# Patient Record
Sex: MALE | Race: WHITE | NOT HISPANIC OR LATINO | Employment: FULL TIME | URBAN - METROPOLITAN AREA
[De-identification: names, ages, dates, MRNs, and addresses within clinical notes are randomized per-mention and may not be internally consistent; named-entity substitution may affect disease eponyms.]

---

## 2019-05-28 ENCOUNTER — OFFICE VISIT - HEALTHEAST (OUTPATIENT)
Dept: FAMILY MEDICINE | Facility: CLINIC | Age: 49
End: 2019-05-28

## 2019-05-28 DIAGNOSIS — Z23 NEED FOR DIPHTHERIA-TETANUS-PERTUSSIS (TDAP) VACCINE: ICD-10-CM

## 2019-05-28 DIAGNOSIS — Z72.0 TOBACCO ABUSE: ICD-10-CM

## 2019-05-28 DIAGNOSIS — Z00.00 ANNUAL PHYSICAL EXAM: ICD-10-CM

## 2019-05-28 DIAGNOSIS — H61.23 HEARING LOSS DUE TO CERUMEN IMPACTION, BILATERAL: ICD-10-CM

## 2019-05-28 DIAGNOSIS — R76.8 POSITIVE HEPATITIS C ANTIBODY TEST: ICD-10-CM

## 2019-05-28 DIAGNOSIS — Z13.220 LIPID SCREENING: ICD-10-CM

## 2019-05-28 DIAGNOSIS — Z13.1 SCREENING FOR DIABETES MELLITUS: ICD-10-CM

## 2019-05-28 ASSESSMENT — MIFFLIN-ST. JEOR: SCORE: 1553.56

## 2019-05-29 LAB
ALBUMIN SERPL-MCNC: 4.3 G/DL (ref 3.5–5)
ALP SERPL-CCNC: 64 U/L (ref 45–120)
ALT SERPL W P-5'-P-CCNC: 24 U/L (ref 0–45)
ANION GAP SERPL CALCULATED.3IONS-SCNC: 11 MMOL/L (ref 5–18)
AST SERPL W P-5'-P-CCNC: 20 U/L (ref 0–40)
BILIRUB SERPL-MCNC: 0.5 MG/DL (ref 0–1)
BUN SERPL-MCNC: 12 MG/DL (ref 8–22)
CALCIUM SERPL-MCNC: 9.8 MG/DL (ref 8.5–10.5)
CHLORIDE BLD-SCNC: 102 MMOL/L (ref 98–107)
CHOLEST SERPL-MCNC: 215 MG/DL
CO2 SERPL-SCNC: 26 MMOL/L (ref 22–31)
CREAT SERPL-MCNC: 0.89 MG/DL (ref 0.7–1.3)
FASTING STATUS PATIENT QL REPORTED: NO
GFR SERPL CREATININE-BSD FRML MDRD: >60 ML/MIN/1.73M2
GLUCOSE BLD-MCNC: 93 MG/DL (ref 70–125)
HDLC SERPL-MCNC: 47 MG/DL
LDLC SERPL CALC-MCNC: 123 MG/DL
POTASSIUM BLD-SCNC: 3.7 MMOL/L (ref 3.5–5)
PROT SERPL-MCNC: 7.2 G/DL (ref 6–8)
SODIUM SERPL-SCNC: 139 MMOL/L (ref 136–145)
TRIGL SERPL-MCNC: 226 MG/DL

## 2019-05-30 LAB — HCV AB SERPL QL IA: POSITIVE

## 2019-05-31 ENCOUNTER — AMBULATORY - HEALTHEAST (OUTPATIENT)
Dept: FAMILY MEDICINE | Facility: CLINIC | Age: 49
End: 2019-05-31

## 2019-05-31 DIAGNOSIS — Z59.71 INSURANCE COVERAGE PROBLEMS: ICD-10-CM

## 2019-05-31 DIAGNOSIS — R76.8 POSITIVE HEPATITIS C ANTIBODY TEST: ICD-10-CM

## 2019-06-04 ENCOUNTER — COMMUNICATION - HEALTHEAST (OUTPATIENT)
Dept: NURSING | Facility: CLINIC | Age: 49
End: 2019-06-04

## 2019-06-19 ENCOUNTER — COMMUNICATION - HEALTHEAST (OUTPATIENT)
Dept: FAMILY MEDICINE | Facility: CLINIC | Age: 49
End: 2019-06-19

## 2019-06-25 ENCOUNTER — COMMUNICATION - HEALTHEAST (OUTPATIENT)
Dept: NURSING | Facility: CLINIC | Age: 49
End: 2019-06-25

## 2019-08-05 ENCOUNTER — COMMUNICATION - HEALTHEAST (OUTPATIENT)
Dept: FAMILY MEDICINE | Facility: CLINIC | Age: 49
End: 2019-08-05

## 2020-02-05 ENCOUNTER — COMMUNICATION - HEALTHEAST (OUTPATIENT)
Dept: FAMILY MEDICINE | Facility: CLINIC | Age: 50
End: 2020-02-05

## 2020-07-12 ENCOUNTER — COMMUNICATION - HEALTHEAST (OUTPATIENT)
Dept: SCHEDULING | Facility: CLINIC | Age: 50
End: 2020-07-12

## 2021-04-30 ENCOUNTER — AMBULATORY - HEALTHEAST (OUTPATIENT)
Dept: NURSING | Facility: CLINIC | Age: 51
End: 2021-04-30

## 2021-05-21 ENCOUNTER — AMBULATORY - HEALTHEAST (OUTPATIENT)
Dept: NURSING | Facility: CLINIC | Age: 51
End: 2021-05-21

## 2021-05-28 ENCOUNTER — RECORDS - HEALTHEAST (OUTPATIENT)
Dept: ADMINISTRATIVE | Facility: CLINIC | Age: 51
End: 2021-05-28

## 2021-05-29 NOTE — PROGRESS NOTES
Attempt 2: Care Guide called patient.  If this patient is returning my call, please transfer to Tatiana at ext [phone].

## 2021-05-29 NOTE — PROGRESS NOTES
Assessment:      Healthy male exam.      Plan:      1. Annual physical exam     2. Lipid screening  Lipid Cascade   3. Tobacco abuse     4. Screening for diabetes mellitus  Glucose   5. Need for diphtheria-tetanus-pertussis (Tdap) vaccine  Tdap vaccine,  6yo or older,  IM   6. Hearing loss due to cerumen impaction, bilateral       The right ear was lavaged and the patient experienced dizziness.  Enough lavage was performed where it broke up the cerumen and TM was visible post procedure.  We elected to stop and let him try to clear out the left ear with an over-the-counter lavage kit.  Recommended no Q-tips.  Encouraged no tobacco use.  Encouraged him also to update his tetanus but he declines.  Check screening labs along with liver function and recheck of hepatitis C antibodies.    Subjective:      Danilo Agrawal is a 49 y.o. male who presents for an annual exam. The patient reports that there is not domestic violence in his life.     Overall doing well.  No significant concerns today.  He does continue to use chewing tobacco and is not ready to quit.  Works as a .  Due for tetanus.  Has a fiancé with whom he has been together with for years.  Previous labs done years ago show that he had an equivocal hepatitis C and was told to come back in a month to recheck it although that never happened.  No upper right quadrant pain.  No complaints of jaundice, nausea, unintentional weight loss, fatigue, malaise.    Healthy Habits:   Regular Exercise: Yes  Sunscreen Use: No  Healthy Diet: Yes  Dental Visits Regularly: No-  Seat Belt: Yes  Sexually active: Yes  Monthly Self Testicular Exams:  No  Hemoccults: No  Flex Sig: No  Colonoscopy: No  Lipid Profile: No  Glucose Screen: Yes  Prevention of Osteoporosis: No  Last Dexa: No  Guns at Home:  No      There is no immunization history on file for this patient.  Immunization status: up to date and documented, due today.    No exam data present    No current outpatient  medications on file.     No current facility-administered medications for this visit.      Past Medical History:   Diagnosis Date     Tobacco abuse     chews     Past Surgical History:   Procedure Laterality Date     ARM WOUND REPAIR / CLOSURE  2007    work comp     chest wound  2007    work comp     Patient has no known allergies.  Family History   Problem Relation Age of Onset     Diabetes Mother      Hypertension Mother      Hyperlipidemia Mother      Hypertension Father      Hyperlipidemia Father      Cardiomyopathy Father      Breast cancer Sister      COPD Maternal Grandmother      Heart disease Maternal Grandfather      Diabetes Maternal Grandfather      COPD Paternal Grandmother      Hypertension Paternal Grandmother      Stroke Paternal Grandmother      Heart disease Paternal Grandmother      Social History     Socioeconomic History     Marital status: Single     Spouse name: Not on file     Number of children: Not on file     Years of education: Not on file     Highest education level: Not on file   Occupational History     Not on file   Social Needs     Financial resource strain: Not on file     Food insecurity:     Worry: Not on file     Inability: Not on file     Transportation needs:     Medical: Not on file     Non-medical: Not on file   Tobacco Use     Smoking status: Former Smoker     Smokeless tobacco: Current User   Substance and Sexual Activity     Alcohol use: Not on file     Drug use: Not on file     Sexual activity: Not on file   Lifestyle     Physical activity:     Days per week: Not on file     Minutes per session: Not on file     Stress: Not on file   Relationships     Social connections:     Talks on phone: Not on file     Gets together: Not on file     Attends [employer] service: Not on file     Active member of club or organization: Not on file     Attends meetings of clubs or organizations: Not on file     Relationship status: Not on file     Intimate partner violence:     Fear of  "current or ex partner: Not on file     Emotionally abused: Not on file     Physically abused: Not on file     Forced sexual activity: Not on file   Other Topics Concern     Not on file   Social History Narrative     Not on file       Review of Systems  General:  Denies problem  Eyes: Denies problem  Ears/Nose/Throat: Denies problem  Cardiovascular: Denies problem  Respiratory:  Denies problem  Gastrointestinal:  Denies problem  Genitourinary: Denies problem  Musculoskeletal:  Denies problem  Skin: Denies problem  Neurologic: Denies problem  Psychiatric: Denies problem  Endocrine: Denies problem  Heme/Lymphatic: Denies problem   Allergic/Immunologic: Denies problem        Objective:     Vitals:    05/28/19 1633   BP: 114/78   Pulse: 63   Temp: 98.6  F (37  C)   TempSrc: Oral   SpO2: 97%   Weight: 164 lb (74.4 kg)   Height: 5' 6.75\" (1.695 m)     Body mass index is 25.88 kg/m .    Physical  General Appearance: Alert, cooperative, no distress, appears stated age  Head: Normocephalic, without obvious abnormality, atraumatic  Eyes: PERRL, conjunctiva/corneas clear, EOM's intact  Ears: Large amounts of cerumen impaction bilaterally.    Nose: Nares normal, septum midline,mucosa normal, no drainage  Throat: Lips, mucosa, and tongue normal; teeth and gums normal  Neck: Supple, symmetrical, trachea midline, no adenopathy;  thyroid: not enlarged, symmetric, no tenderness/mass/nodules; no carotid bruit or JVD  Back: Symmetric, no curvature, ROM normal, no CVA tenderness  Lungs: Clear to auscultation bilaterally, respirations unlabored  Heart: Regular rate and rhythm, S1 and S2 normal, no murmur, rub, or gallop,  Abdomen: Soft, non-tender, bowel sounds active all four quadrants,  no masses, no organomegaly  Genitourinary: Penis normal. Right testis is descended. Left testis is descended.   Musculoskeletal: Normal range of motion. No joint swelling or deformity.   Extremities: Extremities normal, atraumatic, no cyanosis or " edema  Skin: Skin color, texture, turgor normal, no rashes or lesions  Lymph nodes: Cervical, supraclavicular, and axillary nodes normal  Neurologic: He is alert. He has normal reflexes.   Psychiatric: He has a normal mood and affect.      Jarrod Mujica, CNP

## 2021-05-29 NOTE — PROGRESS NOTES
Attempt 1: Care Guide called patient.  If this patient is returning my call, please transfer to Tatiana at ext [phone].

## 2021-05-29 NOTE — PATIENT INSTRUCTIONS - HE
You need to update your tetanus booster, especially given your line of work. I've placed the order and you can get it done here during our shot clinic anytime    Stopping all tobacco will be the most important thing you can do for your long term health.    Starting next year you will start screening for prostate cancer and colon cancer.    Thank you for coming in today!    If you receive a survey from [employer] about your experience today, it would be very helpful if you could fill it out to let us know what went well and what we can improve!    General Information:    Today you had your appointment with Jarrod Mujica NP    My hours are:    Monday : Out of clinic  Tuesday : 8:00AM - 5:00 PM  Wednesday: 8:00AM - 5:00 PM  Thursday: 8:00AM - 5:00 PM  Friday: 8:00AM - 5:00 PM    I am not in the office Mondays. Therefore non-urgent calls and medical messages received on Monday will be addressed when I am back in the office on Tuesday. Urgent matters will be reviewed and addressed by one of my partners in the office as needed.    If lab work was done today as part of your evaluation you will generally be contacted via [employer], mail, or phone with the results within 1-5 days. If there is an alarming result we will contact you by phone. Lab results come back at varying times, I generally wait until all lab results are available before making comments on the results.     If you need refills please contact your pharmacy. They will send a refill request to me to review. Please allow 3-5 business days for us to process all refill requests.     My Clinical Assistant is Tiffanie. Please call us at [phone] or send a medical message with any questions or concerns.

## 2021-05-30 ENCOUNTER — RECORDS - HEALTHEAST (OUTPATIENT)
Dept: ADMINISTRATIVE | Facility: CLINIC | Age: 51
End: 2021-05-30

## 2021-05-30 NOTE — PROGRESS NOTES
Care Guide has called this patient 3 times over the past two weeks and has been unsuccessful in reaching him. This patient has also not returned any of my messages. Patient has been unsuccessful to reach by phone or [facility] and no further outreach will be done and at this time will be removed from the work queue. I have discussed with the patient's PCP that I have been unable to reach the patient.

## 2021-06-03 VITALS — HEIGHT: 67 IN | WEIGHT: 164 LBS | BODY MASS INDEX: 25.74 KG/M2

## 2021-06-09 NOTE — TELEPHONE ENCOUNTER
Patient calling - says his boss sent him a message and says everyone with the company is supposed to get tested for COVID19 tomorrow.  His boss's family member has COVID19.  Patient has cough and sore throat that started this morning.  Has not taken his temperature.    No difficulty breathing.  No chest pain.    Triaged to disposition of Call PCP Within 24 Hours.  Per current nurse triage protocol - advised patient to go to [email or web address] now to set up virtual visit.  Care advice and isolation recommendations given per protocol.    Advised to call back if difficulty breathing occurs, chest pain occurs, fever over 103 occurs or symptoms worsen.    Carlita Bryan, WINSOME  Triage Nurse Advisor    COVID 19 Nurse Triage Plan/Patient Instructions    Please be aware that novel coronavirus (COVID-19) may be circulating in the community. If you develop symptoms such as fever, cough, or SOB or if you have concerns about the presence of another infection including coronavirus (COVID-19), please contact your health care provider or visit [email or web address].     Disposition/Instructions    Virtual Visit with provider recommended. Reference Visit Selection Guide.    Thank you for taking steps to prevent the spread of this virus.  o Limit your contact with others.  o Wear a simple mask to cover your cough.  o Wash your hands well and often.    Resources    [facility]: About COVID-19: [email or web address]    CDC: What to Do If You're Sick: www.cdc.gov/coronavirus/2019-ncov/about/steps-when-sick.html    CDC: Ending Home Isolation: www.cdc.gov/coronavirus/2019-ncov/hcp/disposition-in-home-patients.html     CDC: Caring for Someone: www.cdc.gov/coronavirus/2019-ncov/if-you-are-sick/care-for-someone.html     [facility]: Interim Guidance for Hospital Discharge to Home: www.health.[facility]/diseases/coronavirus/hcp/hospdischarge.pdf    [facility] clinical trials (COVID-19 research studies):  [email or web address].[facility]-clinical-trials     Below are the COVID-19 hotlines at the [place] [facility] ([facility]). Interpreters are available.   o For health questions: Call 018-127-0211 or 1-629.223.7676 (7 a.m. to 7 p.m.)  o For questions about schools and childcare: Call 658-508-5645 or 1-123.741.3856 (7 a.m. to 7 p.m.)         Reason for Disposition    [1] COVID-19 infection suspected by caller or triager AND [2] mild symptoms (cough, fever, or others) AND [3] no complications or SOB    Additional Information    Negative: SEVERE difficulty breathing (e.g., struggling for each breath, speaks in single words)    Negative: Difficult to awaken or acting confused (e.g., disoriented, slurred speech)    Negative: Bluish (or gray) lips or face now    Negative: Shock suspected (e.g., cold/pale/clammy skin, too weak to stand, low BP, rapid pulse)    Negative: Sounds like a life-threatening emergency to the triager    Negative: [1] COVID-19 exposure AND [2] no symptoms    Negative: COVID-19 and Breastfeeding, questions about    Negative: [1] Adult with possible COVID-19 symptoms AND [2] triager concerned about severity of symptoms or other causes    Negative: SEVERE or constant chest pain or pressure (Exception: mild central chest pain, present only when coughing)    Negative: MODERATE difficulty breathing (e.g., speaks in phrases, SOB even at rest, pulse 100-120)    Negative: Patient sounds very sick or weak to the triager    Negative: MILD difficulty breathing (e.g., minimal/no SOB at rest, SOB with walking, pulse <100)    Negative: Chest pain or pressure    Negative: Fever > 103 F (39.4 C)    Negative: [1] Fever > 101 F (38.3 C) AND [2] age > 60    Negative: [1] Fever > 100.0 F (37.8 C) AND [2] bedridden (e.g., nursing home patient, CVA, chronic illness, recovering from surgery)    Negative: HIGH RISK patient (e.g., age > 64 years, diabetes, heart or lung disease, weak immune system)    Negative: Fever present  > 3 days (72 hours)    Negative: [1] Fever returns after gone for over 24 hours AND [2] symptoms worse or not improved    Negative: [1] Continuous (nonstop) coughing interferes with work or school AND [2] no improvement using cough treatment per protocol    Protocols used: CORONAVIRUS (COVID-19) DIAGNOSED OR [id number] 5.16.20

## 2021-06-20 NOTE — LETTER
Letter by Jarrod Mujica CNP at      Author: Jarrod Mujica CNP Service: -- Author Type: --    Filed:  Encounter Date: 2/5/2020 Status: (Other)           Danilo Agrawal  [address] [facility] [address]      February 5, 2020      Dear Danilo Agrawal ,     It is our goal to provide quality care to you and help you maintain optimum health. Our records show that you are currently due for a colonoscopy. Colon and rectum cancer is the second leading cause of cancer death among American men and women. With early detection, colorectal cancer is highly preventable, treatable and often curable. Risk for colorectal cancer increases with age and your best defense are regular colonoscopy screenings.     Recommendations for men and women with an average risk of colorectal cancer  -Ages 50 and older colonoscopy screenings every 10 years    -If you are unable to do this particular screening there are other screening tests that may be completed. Contact your primary doctor to discuss these alternative options.     Please Call [phone] or send a [employer] message to let us know you would like to set up your colonoscopy. Your primary doctor can place an order. We partner with [facility] to complete these screenings. They will call to help you set up an appointment at your convenience.      Please call us [phone] with any other questions or concerns regarding your health.  Our clinic is open Monday - Friday between 8 AM - 5 PM.          Sincerely,        [facility] Care Team

## 2021-07-03 NOTE — ADDENDUM NOTE
Addendum Note by Shira Watt CMA at 5/28/2019  4:20 PM     Author: Shira Watt CMA Service: -- Author Type: Certified Medical Assistant    Filed: 5/29/2019  4:22 PM Encounter Date: 5/28/2019 Status: Signed    : Shira Watt CMA (Certified Medical Assistant)    Addended by: SHIRA WATT on: 5/29/2019 04:22 PM        Modules accepted: Orders

## 2022-04-20 ENCOUNTER — OFFICE VISIT (OUTPATIENT)
Dept: FAMILY MEDICINE | Facility: CLINIC | Age: 52
End: 2022-04-20
Payer: COMMERCIAL

## 2022-04-20 ENCOUNTER — HOSPITAL ENCOUNTER (OUTPATIENT)
Dept: ULTRASOUND IMAGING | Facility: CLINIC | Age: 52
Discharge: HOME | End: 2022-04-20
Attending: PHYSICIAN ASSISTANT | Admitting: PHYSICIAN ASSISTANT
Payer: COMMERCIAL

## 2022-04-20 VITALS
TEMPERATURE: 98 F | BODY MASS INDEX: 24.62 KG/M2 | OXYGEN SATURATION: 98 % | DIASTOLIC BLOOD PRESSURE: 80 MMHG | WEIGHT: 156 LBS | HEART RATE: 58 BPM | SYSTOLIC BLOOD PRESSURE: 125 MMHG | RESPIRATION RATE: 18 BRPM

## 2022-04-20 DIAGNOSIS — S86.812A STRAIN OF CALF MUSCLE, LEFT, INITIAL ENCOUNTER: ICD-10-CM

## 2022-04-20 DIAGNOSIS — M79.662 PAIN OF LEFT CALF: Primary | ICD-10-CM

## 2022-04-20 DIAGNOSIS — M79.662 PAIN OF LEFT CALF: ICD-10-CM

## 2022-04-20 PROCEDURE — 99214 OFFICE O/P EST MOD 30 MIN: CPT | Performed by: PHYSICIAN ASSISTANT

## 2022-04-20 PROCEDURE — 93971 EXTREMITY STUDY: CPT | Mod: LT

## 2022-04-20 ASSESSMENT — ENCOUNTER SYMPTOMS
SHORTNESS OF BREATH: 0
COLOR CHANGE: 0
WOUND: 0

## 2022-04-20 NOTE — PROGRESS NOTES
Assessment & Plan:        ICD-10-CM    1. Pain of left calf  M79.662 US Lower Extremity Venous Duplex Left     CANCELED: US Lower Extremity Venous Duplex Left   2. Strain of calf muscle, left, initial encounter  S86.812A          Plan/Clinical Decision Making:    Suspect calf strain.    Obtain US to r/o DVT.   As long as negative US will treat as musculoskeletal injury.   Take Naproxen twice a day with food, frequent stretches. Ice and rest.     Follow-up if worsening or not improving.     At the end of the encounter, I discussed results, diagnosis, medications. Discussed red flags for immediate return to clinic/ER, as well as indications for follow up if no improvement. Patient understood and agreed to plan. Patient was stable for discharge.        Sada Muniz PA-C on 4/20/2022 at 11:37 AM          Subjective:     HPI:    Danilo is a 52 year old male who presents to clinic today for the following health issues:  Chief Complaint   Patient presents with     Leg Problem      Left leg pain off and on since last Friday morning.     HPI    Patient complains of off and on calf pain. Started on Friday. Better over weekend.   On Monday and Tuesday had increased calf pain. Having swelling.   Works as .  No hx of injury or trauma.   Feeling area of firmness in calf.   This morning had some pain when he woke up.  After taking a shower felt better.   Has tried Aleve. Has tried OTC muscle cream.   Severity of pain fluctuating.   Stairs seem to bother pain.     History obtained from the patient.    Review of Systems   Respiratory: Negative for shortness of breath.    Cardiovascular: Negative for chest pain.   Skin: Negative for color change, rash and wound.     Nonsmoker.   Hx of chewing.     Currently on Amoxicillin for sinus infection.     Patient Active Problem List   Diagnosis     Nicotine Dependence     Limb Pain        No past medical history on file.    Social History     Tobacco Use     Smoking status: Former  Smoker     Smokeless tobacco: Current User   Substance Use Topics     Alcohol use: Yes             Objective:     Vitals:    04/20/22 1133   BP: 125/80   Pulse: 58   Resp: 18   Temp: 98  F (36.7  C)   TempSrc: Oral   SpO2: 98%   Weight: 70.8 kg (156 lb)         Physical Exam   EXAM:   Pleasant, alert, appropriate appearance. NAD.  Head Exam: Normocephalic, atraumatic.  Neck/Thyroid Exam:  No LAD.  No nodules or enlargement.  Chest/Respiratory Exam: CTAB.  Cardiovascular Exam: RRR. No murmur or rubs.  Ext/musculoskeletal: FROM of left calf.  Nl posterior tibialis pulse.    Bilateral calves measure 37cm, no redness, pain on palpation of calf.   Neuro: CN II-XII intact grossly intact.  Skin: no rash or lesion.      Results:  Results for orders placed or performed during the hospital encounter of 04/20/22   US Lower Extremity Venous Duplex Left     Status: None    Narrative    EXAM: US LOWER EXTREMITY VENOUS DUPLEX LEFT  LOCATION: [facility]  DATE/TIME: 4/20/2022 7:05 PM    INDICATION: calf pain x 1 week  COMPARISON: None.  TECHNIQUE: Venous Duplex ultrasound of the left lower extremity with and without compression, augmentation and duplex. Color flow and spectral Doppler with waveform analysis performed.    FINDINGS: Exam includes the common femoral, femoral, popliteal, and contralateral common femoral veins as well as segmentally visualized deep calf veins and greater saphenous vein.     LEFT: No deep vein thrombosis. No superficial thrombophlebitis. No popliteal cyst.      Impression    IMPRESSION:  1.  No deep venous thrombosis in the left lower extremity.

## 2023-01-26 ENCOUNTER — OFFICE VISIT (OUTPATIENT)
Dept: FAMILY MEDICINE | Facility: CLINIC | Age: 53
End: 2023-01-26
Payer: OTHER MISCELLANEOUS

## 2023-01-26 ENCOUNTER — ANCILLARY PROCEDURE (OUTPATIENT)
Dept: GENERAL RADIOLOGY | Facility: CLINIC | Age: 53
End: 2023-01-26
Attending: FAMILY MEDICINE
Payer: COMMERCIAL

## 2023-01-26 VITALS
WEIGHT: 160 LBS | HEART RATE: 87 BPM | RESPIRATION RATE: 18 BRPM | SYSTOLIC BLOOD PRESSURE: 127 MMHG | DIASTOLIC BLOOD PRESSURE: 80 MMHG | TEMPERATURE: 98.5 F | OXYGEN SATURATION: 98 % | BODY MASS INDEX: 25.25 KG/M2

## 2023-01-26 DIAGNOSIS — S69.92XA HAND INJURY, LEFT, INITIAL ENCOUNTER: ICD-10-CM

## 2023-01-26 DIAGNOSIS — S60.222A CONTUSION OF LEFT HAND, INITIAL ENCOUNTER: Primary | ICD-10-CM

## 2023-01-26 PROCEDURE — 73130 X-RAY EXAM OF HAND: CPT | Mod: TC | Performed by: RADIOLOGY

## 2023-01-26 PROCEDURE — 99213 OFFICE O/P EST LOW 20 MIN: CPT | Performed by: FAMILY MEDICINE

## 2023-01-26 RX ORDER — COVID-19 ANTIGEN TEST
220 KIT MISCELLANEOUS 2 TIMES DAILY WITH MEALS
COMMUNITY

## 2023-01-26 NOTE — PROGRESS NOTES
Assessment:       Contusion of left hand, initial encounter    Hand injury, left, initial encounter  - XR Hand Left G/E 3 Views         Plan:     Patient with contusion of his left hand.  X-ray ordered and personally reviewed by myself of his left hand showing no evidence of fracture or other abnormality that I can appreciate.  Recommend ice, NSAIDs, rest.  Note given for work.  Follow-up if symptoms getting worse or not improving as expected over the next week.    MEDICATIONS:   Orders Placed This Encounter   Medications     naproxen sodium (ALEVE) 220 MG capsule     Sig: Take 220 mg by mouth 2 times daily (with meals)     32 minutes spent in patient history, exam, ordering x-rays, reviewing x-rays, discussing plan of care with patient, writing note for patient, documentation.    Subjective:       53 year old male presents for evaluation of a left hand injury that he sustained at work yesterday around 10:30 AM.  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  He has had pain at the base of his thumb ever since.  He has had some swelling.  He has been icing it.  He is here today for further evaluation.  No other injury.    Patient Active Problem List   Diagnosis     Nicotine Dependence     Limb Pain       No past medical history on file.    Past Surgical History:   Procedure Laterality Date     CLOSE SECONDARY WOUND UPPER EXTREMITY  2007    work comp     OTHER SURGICAL HISTORY  2007    chest woundwork comp       Current Outpatient Medications   Medication     naproxen sodium (ALEVE) 220 MG capsule     No current facility-administered medications for this visit.       No Known Allergies    Family History   Problem Relation Age of Onset     Diabetes Mother         controlled with lifestyle     Hypertension Mother      Hyperlipidemia Mother      Hypertension Father      Hyperlipidemia Father      Cardiomyopathy Father      Breast Cancer Sister      Chronic Obstructive Pulmonary Disease  Maternal Grandmother      Heart Disease Maternal Grandfather      Diabetes Maternal Grandfather      Chronic Obstructive Pulmonary Disease Paternal Grandmother      Hypertension Paternal Grandmother      Cerebrovascular Disease Paternal Grandmother      Heart Disease Paternal Grandmother        Social History     Socioeconomic History     Marital status: Single     Spouse name: None     Number of children: None     Years of education: None     Highest education level: None   Tobacco Use     Smoking status: Former     Smokeless tobacco: Current   Substance and Sexual Activity     Alcohol use: Yes     Drug use: Never     Sexual activity: Yes     Birth control/protection: I.U.D.         Review of Systems  Pertinent items are noted in HPI.      Objective:     /80   Pulse 87   Temp 98.5  F (36.9  C)   Resp 18   Wt 72.6 kg (160 lb)   SpO2 98%   BMI 25.25 kg/m       General appearance: alert, appears stated age and cooperative  Extremities: Patient's left hand examined and he has some swelling and some mild ecchymosis over the thenar eminence.  There is no deformity noted.  He is neurovascularly intact.  He has pain with flexion and extension of his thumb.  No pain with flexion extension of his wrist.      Results for orders placed or performed in visit on 01/26/23   XR Hand Left G/E 3 Views     Status: None    Narrative    EXAM: XR HAND LEFT G/E 3 VIEWS  LOCATION: [facility]  DATE/TIME: 1/26/2023 1:56 PM    INDICATION: Pain after injury  COMPARISON: None.      Impression    IMPRESSION: The left hand is negative for fracture.       This note has been dictated using voice recognition software. Any grammatical or context distortions are unintentional and inherent to the software

## 2023-01-26 NOTE — PATIENT INSTRUCTIONS
Good news, the x-ray of your left hand is negative for any fracture.  I suspect you bruised your hand and your injury yesterday and this should resolve on its own over the next 1 to 2 weeks.  Continue to ice and you may use ibuprofen as needed for discomfort.  Follow-up if symptoms are getting worse or not improving as expected.

## 2023-01-26 NOTE — LETTER
January 26, 2023      Danilo Agrawal  [address]  [facility] [id number]        To Whom It May Concern:    Danilo Agrawal  was seen on 1/26/2023.  Please excuse him from work entirely on 1/26/2023 on 1/27/2023 due to a left hand injury that he sustained at work on 1/25/2023.  He may return to work without restrictions on 1/30/2023.  If he is unable to work at that time, patient needs to be reevaluated with an in person visit to reassess workability.      Sincerely,        Shilpa Sylvester MD

## 2023-01-30 ENCOUNTER — OFFICE VISIT (OUTPATIENT)
Dept: FAMILY MEDICINE | Facility: CLINIC | Age: 53
End: 2023-01-30
Payer: OTHER MISCELLANEOUS

## 2023-01-30 ENCOUNTER — MYC MEDICAL ADVICE (OUTPATIENT)
Dept: FAMILY MEDICINE | Facility: CLINIC | Age: 53
End: 2023-01-30
Payer: COMMERCIAL

## 2023-01-30 ENCOUNTER — NURSE TRIAGE (OUTPATIENT)
Dept: NURSING | Facility: CLINIC | Age: 53
End: 2023-01-30
Payer: COMMERCIAL

## 2023-01-30 VITALS
RESPIRATION RATE: 16 BRPM | HEART RATE: 61 BPM | SYSTOLIC BLOOD PRESSURE: 146 MMHG | OXYGEN SATURATION: 98 % | TEMPERATURE: 98.1 F | DIASTOLIC BLOOD PRESSURE: 92 MMHG

## 2023-01-30 DIAGNOSIS — S60.222D CONTUSION OF LEFT HAND, SUBSEQUENT ENCOUNTER: Primary | ICD-10-CM

## 2023-01-30 DIAGNOSIS — S69.92XD INJURY OF LEFT HAND, SUBSEQUENT ENCOUNTER: ICD-10-CM

## 2023-01-30 PROCEDURE — 99213 OFFICE O/P EST LOW 20 MIN: CPT | Performed by: PHYSICIAN ASSISTANT

## 2023-01-30 NOTE — TELEPHONE ENCOUNTER
Injured at work on hand   Seen by SABA DURAN 1/26/23  They said if things did not change he should try to get in Monday or Tuesday     Injury has not fully healed yet and he needs more time off   Cannot use hand normally. Will not be able to perform his job duties.     No appointments available in [place]     Triaged to a disposition of see a provider within 24 hours. Patient is agreeable. Will go to  or will message/call care team.     Reason for Disposition    Can't use injured hand normally (e.g., make a fist, open fully, hold a glass of water)    Additional Information    Negative: [1] Major bleeding (e.g., actively dripping or spurting) AND [2] can't be stopped    Negative: Amputation    Negative: Serious injury with multiple fractures (broken bones)    Negative: Sounds like a life-threatening emergency to the triager    Negative: Finger injury is main concern    Negative: Caused by an animal bite    Negative: Caused by a human bite    Negative: Wound looks infected    Negative: Cast problems or questions    Negative: Bullet wound, stabbed by knife, or other serious penetrating wound    Negative: Looks like a broken bone (e.g., knuckle is gone or depressed)    Negative: Looks like a dislocated joint (e.g., crooked or deformed)    Negative: Cut over knuckle (MCP joint)    Negative: Skin is split open or gaping  (or length > 1/2 inch or 12 mm)    Negative: [1] Bleeding AND [2] won't stop after 10 minutes of direct pressure (using correct technique)    Negative: [1] Dirt in the wound AND [2] not removed with 15 minutes of scrubbing    Negative: [1] Numbness (loss of sensation) of finger(s) AND [2] present now    Negative: High pressure injection injury (e.g., from grease gun or paint gun, usually work-related)    Negative: Sounds like a serious injury to the triager    Negative: [1] SEVERE pain AND [2] not improved 2 hours after pain medicine/ice packs    Negative: [1] Large swelling or bruise (> 2 inches or 5  cm) AND [2] can't use injured hand normally (e.g., make a fist, open fully, hold a glass of water)    Negative: Suspicious history for the injury    Protocols used: HAND AND WRIST INJURY-A-    Josey Mccoy RN on 1/30/2023 at 7:25 AM

## 2023-01-30 NOTE — LETTER
January 30, 2023  RE:  Danilo Agrawal                                                                                                                  [id number]  [facility] [id number]      To whom it may concern:    I evaluated Danilo Agrawal on January 30, 2023. Danilo Agrawal should be excused from work/school from 1/30/2023 through 2/2/23 and can return on 2/3/2023 to the light duty that does not require him to use his left hand exceeding weight or force of 10 pounds.  He can return to work without restrictions 2/5/2023    Sincerely,  Elroy Casillas PA-C

## 2023-01-30 NOTE — PATIENT INSTRUCTIONS
Ice for the next 2-3 days.  Modify activity.  Mild pain is okay as long as it resolves after a minute or 2 of discontinuing the activity.  Decreased level activity if pain is moderate to severe or last longer than a few minutes after discontinuing.  Naproxen can be continue to use or Voltaren gel 4 times a day for the next 1 to 2 weeks can be effective.  Has minimal side effects but can take a few days to begin working and have to be diligent about application.  Schedule an appointment with primary care now and you can cancel if you improve.

## 2023-01-30 NOTE — PROGRESS NOTES
Chief Complaint   Patient presents with     Hand Problem     Re evaluate left hand problem, still having problems needs more time to heal up        ASSESSMENT/PLAN:  Danilo was seen today for hand problem.    Diagnoses and all orders for this visit:    Contusion of left hand, subsequent encounter    Injury of left hand, subsequent encounter    Continue previously recommended treatment plan. Recommend ice, NSAIDs, rest.  Note given for work.  Follow-up if symptoms getting worse or not improving as expected over the next week.    Elroy Casillas PA-C      SUBJECTIVE:  Danilo is a 53 year old male who presents to urgent care with continued pain of his left hand.  He was seen 3 days ago and given work restrictions but feels he needs more time because he has not been able to heal fully and still feels pain when using thumb.  A wood framing truss hit the palm of his left hand 4 days ago.  X-rays were negative.  He was given naproxen for pain.    ROS: Pertinent ROS neg other than the symptoms noted above in the HPI.     OBJECTIVE:  BP (!) 146/92   Pulse 61   Temp 98.1  F (36.7  C) (Oral)   Resp 16   SpO2 98%    General appearance: alert, appears stated age and cooperative  Extremities: Patient's left hand examined and he has some swelling and  tenderness over the thenar eminence.  There is no deformity noted.  He is neurovascularly intact.  He has pain with flexion and extension of his thumb.  No pain with flexion extension of his wrist.    DIAGNOSTICS    No results found for any visits on 01/30/23.     Current Outpatient Medications   Medication     naproxen sodium 220 MG capsule     No current facility-administered medications for this visit.      Patient Active Problem List   Diagnosis     Nicotine Dependence     Limb Pain      No past medical history on file.  Past Surgical History:   Procedure Laterality Date     CLOSE SECONDARY WOUND UPPER EXTREMITY  2007    work comp     OTHER SURGICAL HISTORY  2007    chest  woundwork comp     Family History   Problem Relation Age of Onset     Diabetes Mother         controlled with lifestyle     Hypertension Mother      Hyperlipidemia Mother      Hypertension Father      Hyperlipidemia Father      Cardiomyopathy Father      Breast Cancer Sister      Chronic Obstructive Pulmonary Disease Maternal Grandmother      Heart Disease Maternal Grandfather      Diabetes Maternal Grandfather      Chronic Obstructive Pulmonary Disease Paternal Grandmother      Hypertension Paternal Grandmother      Cerebrovascular Disease Paternal Grandmother      Heart Disease Paternal Grandmother      Social History     Tobacco Use     Smoking status: Former     Smokeless tobacco: Current   Substance Use Topics     Alcohol use: Yes              The plan of care was discussed with the patient. They understand and agree with the course of treatment prescribed. A printed summary was given including instructions and medications.  The use of Dragon/[employer] dictation services may have been used to construct the content in this note; any grammatical or spelling errors are non-intentional. Please contact the author of this note directly if you are in need of any clarification.

## 2023-04-17 ENCOUNTER — TELEPHONE (OUTPATIENT)
Dept: FAMILY MEDICINE | Facility: CLINIC | Age: 53
End: 2023-04-17

## 2023-04-17 NOTE — TELEPHONE ENCOUNTER
Forms Request  Name of form/paperwork: healthcare provider report for work comp  Have you been seen for this request: N/A  Do we have the form: yes, in Nathans inbox  When is form needed by: when done  How would you like the form returned: fax  Patient Notified form requests are processed in 3-5 business days: n/a    Okay to leave a detailed message? n.a

## 2023-04-18 NOTE — TELEPHONE ENCOUNTER
Pt has not been seen by Jarrod since 2020. He was seen for an injury by Alicia and Dr. Sylvester in Jan. Will fax to that clinic.

## 2023-04-19 NOTE — TELEPHONE ENCOUNTER
I believe paperwork would be done by initial provider who saw patient. Routing there. Otherwise patient should follow up with  PT as Work Comp is usually not handled in [facility]

## 2023-04-22 ENCOUNTER — HEALTH MAINTENANCE LETTER (OUTPATIENT)
Age: 53
End: 2023-04-22

## 2023-04-27 ENCOUNTER — OFFICE VISIT (OUTPATIENT)
Dept: FAMILY MEDICINE | Facility: CLINIC | Age: 53
End: 2023-04-27
Payer: COMMERCIAL

## 2023-04-27 VITALS
RESPIRATION RATE: 14 BRPM | OXYGEN SATURATION: 98 % | HEART RATE: 50 BPM | SYSTOLIC BLOOD PRESSURE: 114 MMHG | DIASTOLIC BLOOD PRESSURE: 73 MMHG | BODY MASS INDEX: 25.09 KG/M2 | WEIGHT: 159 LBS | TEMPERATURE: 97.8 F

## 2023-04-27 DIAGNOSIS — R26.81 UNSTEADY: Primary | ICD-10-CM

## 2023-04-27 PROCEDURE — 99213 OFFICE O/P EST LOW 20 MIN: CPT | Performed by: FAMILY MEDICINE

## 2023-04-27 NOTE — LETTER
April 27, 2023      Danilo Agrawal  [address]  [facility] [address]        To Whom It May Concern:    Danilo Agrawal  was seen on 4/27/2023.  Please excuse him from work on 4/27/2023 and 4/28/2023 due to illness.        Sincerely,        Shilpa Sylvester MD

## 2023-04-27 NOTE — PATIENT INSTRUCTIONS
Rest, push fluids, follow-up if symptoms getting worse or not improving in the next 3-4 days.  Take cetirizine (Zyrtec) daily for the fluid in your middle ear.

## 2023-04-27 NOTE — PROGRESS NOTES
"Assessment:       Unsteady     Plan:     Patient with a vague sense of feeling a bit unsteady when he is walking.  No red flag symptoms.  Recommend pushing fluids.  Patient with a middle ear effusion bilaterally.  Recommend cetirizine.  This may be contributing to his symptoms.  Recommend rest and watch symptoms closely and follow-up if getting any worse or concerning symptoms.  Discussed red flag symptoms to watch for with patient and go to ED if developing severe headache, worsening unsteadiness, fevers, vomiting, or any other concerning symptoms.  Patient agreeable with this plan.    Patient Instructions   Rest, push fluids, follow-up if symptoms getting worse or not improving in the next 3-4 days.  Take cetirizine (Zyrtec) daily for the fluid in your middle ear.          Subjective:       53 year old male presents for evaluation of feeling some slight dizziness and feeling like he felt a bit \"off\" earlier today when he was at work.  His ears have been feeling full and somewhat congested.  He denies headache or neck pain.  Nick any vision changes or sensation of vertigo.  He was at work when the sensation started happening.  He feels fine when he is sitting but notices this when he is standing up.  He denies any associated chest pain, palpitations, or dyspnea on exertion.  He admits to not drinking very much water.  Is been drinking more since this episode occurred and is generally feeling a bit better.    Patient Active Problem List   Diagnosis     Nicotine Dependence     Limb Pain       History reviewed. No pertinent past medical history.    Past Surgical History:   Procedure Laterality Date     CLOSE SECONDARY WOUND UPPER EXTREMITY  2007    work comp     OTHER SURGICAL HISTORY  2007    chest woundwork comp       Current Outpatient Medications   Medication     naproxen sodium 220 MG capsule     No current facility-administered medications for this visit.       No Known Allergies    Family History   Problem " Relation Age of Onset     Diabetes Mother         controlled with lifestyle     Hypertension Mother      Hyperlipidemia Mother      Hypertension Father      Hyperlipidemia Father      Cardiomyopathy Father      Breast Cancer Sister      Chronic Obstructive Pulmonary Disease Maternal Grandmother      Heart Disease Maternal Grandfather      Diabetes Maternal Grandfather      Chronic Obstructive Pulmonary Disease Paternal Grandmother      Hypertension Paternal Grandmother      Cerebrovascular Disease Paternal Grandmother      Heart Disease Paternal Grandmother        Social History     Socioeconomic History     Marital status: Single     Spouse name: None     Number of children: None     Years of education: None     Highest education level: None   Tobacco Use     Smoking status: Former     Smokeless tobacco: Current   Substance and Sexual Activity     Alcohol use: Yes     Drug use: Never     Sexual activity: Yes     Birth control/protection: I.U.D.         Review of Systems  Pertinent items are noted in HPI.      Objective:     /73   Pulse 50   Temp 97.8  F (36.6  C) (Oral)   Resp 14   Wt 72.1 kg (159 lb)   SpO2 98%   BMI 25.09 kg/m       General appearance: alert, appears stated age and cooperative  Throat: lips, mucosa, and tongue normal; teeth and gums normal  Neck: no adenopathy, no JVD and supple, symmetrical, trachea midline  Lungs: clear to auscultation bilaterally  Heart: Regular rate and rhythm without murmurs  Extremities: Warm and well-perfused  Skin: Skin color, texture, turgor normal. No rashes or lesions  Neurologic: Alert and oriented X 3, normal strength and tone. Normal symmetric reflexes. Normal coordination and gait, no ataxia.  Walks without difficulty.        This note has been dictated using voice recognition software. Any grammatical or context distortions are unintentional and inherent to the software

## 2023-06-19 NOTE — TELEPHONE ENCOUNTER
Yi calling from [facility] looking for follow up on forms sent to Shilpa Sylvester on 04/27/2023 for a Work related injury. Forms were originally sent to Jarrod Mujica.     Yi would like a call from Shilpa at [phone] to know where to fax the forms to.

## 2023-06-19 NOTE — TELEPHONE ENCOUNTER
Yi @ [facility] called to check on the status of the W/C form for Pt who was seen at [facility] on 04/27/2023.    Form was sent to PCP Jarrod Mujica but Pt hasn't seen him since 2020. W/C form was sent back to [facility] for Shilpa Sylvester on 04/27/2023 for a Work related injury.    Please call Yi back to advise her the status of the healthcare provider W/C report.    Brooklynn Nunez

## 2024-06-29 ENCOUNTER — HEALTH MAINTENANCE LETTER (OUTPATIENT)
Age: 54
End: 2024-06-29

## 2025-07-12 ENCOUNTER — HEALTH MAINTENANCE LETTER (OUTPATIENT)
Age: 55
End: 2025-07-12